# Patient Record
Sex: MALE | Race: WHITE | Employment: UNEMPLOYED | URBAN - METROPOLITAN AREA
[De-identification: names, ages, dates, MRNs, and addresses within clinical notes are randomized per-mention and may not be internally consistent; named-entity substitution may affect disease eponyms.]

---

## 2020-09-25 ENCOUNTER — HOSPITAL ENCOUNTER (OUTPATIENT)
Dept: VASCULAR SURGERY | Age: 50
Discharge: HOME | End: 2020-09-25
Attending: FAMILY MEDICINE
Payer: COMMERCIAL

## 2020-09-25 DIAGNOSIS — I73.9 PERIPHERAL VASCULAR DISEASE, UNSPECIFIED (HCC): ICD-10-CM

## 2020-09-25 PROCEDURE — 93923 UPR/LXTR ART STDY 3+ LVLS: CPT

## 2020-09-26 LAB
LEFT ABI: 1.09
LEFT ANTERIOR TIBIAL: 138 MMHG
LEFT ARM BP: 126 MMHG
LEFT CALF PRESSURE: 134 MMHG
LEFT LOW THIGH PRESSURE: 146 MMHG
LEFT POSTERIOR TIBIAL: 140 MMHG
LEFT TBI: 0.82
LEFT TOE PRESSURE: 105 MMHG
RIGHT ABI: 1.14
RIGHT ANTERIOR TIBIAL: 146 MMHG
RIGHT ARM BP: 128 MMHG
RIGHT CALF PRESSURE: 140 MMHG
RIGHT LOW THIGH PRESSURE: 156 MMHG
RIGHT POSTERIOR TIBIAL: 132 MMHG
RIGHT TBI: 0.7
RIGHT TOE PRESSURE: 89 MMHG

## 2022-02-28 ENCOUNTER — TRANSCRIBE ORDER (OUTPATIENT)
Dept: SCHEDULING | Age: 52
End: 2022-02-28

## 2022-02-28 DIAGNOSIS — Z13.6 SCREENING FOR HEART DISEASE: Primary | ICD-10-CM

## 2022-03-22 ENCOUNTER — HOSPITAL ENCOUNTER (OUTPATIENT)
Dept: CT IMAGING | Age: 52
Discharge: HOME | End: 2022-03-22
Attending: SPECIALIST
Payer: SELF-PAY

## 2022-03-22 DIAGNOSIS — Z13.6 SCREENING FOR HEART DISEASE: ICD-10-CM

## 2022-03-22 PROCEDURE — 75571 CT HRT W/O DYE W/CA TEST: CPT

## 2022-03-23 NOTE — CARDIO/PULMONARY
Reached patient at his given mobile number and shared his coronary artery calcium score of 52 with him. We discussed the meaning of this score. Patient plans to follow up with his PCP, Dr. Melida Galloway, and requests that I fax a copy of this report to her office - which I have done. Patient has no further questions at this time.